# Patient Record
Sex: MALE | Race: WHITE | NOT HISPANIC OR LATINO | Employment: FULL TIME
[De-identification: names, ages, dates, MRNs, and addresses within clinical notes are randomized per-mention and may not be internally consistent; named-entity substitution may affect disease eponyms.]

---

## 2022-09-29 ENCOUNTER — TRANSCRIBE ORDERS (OUTPATIENT)
Dept: OTHER | Age: 27
End: 2022-09-29

## 2022-09-29 DIAGNOSIS — S39.012A STRAIN OF LUMBAR REGION, INITIAL ENCOUNTER: Primary | ICD-10-CM

## 2022-11-11 ENCOUNTER — THERAPY VISIT (OUTPATIENT)
Dept: PHYSICAL THERAPY | Facility: CLINIC | Age: 27
End: 2022-11-11
Attending: PHYSICIAN ASSISTANT
Payer: COMMERCIAL

## 2022-11-11 DIAGNOSIS — S39.012A STRAIN OF LUMBAR REGION, INITIAL ENCOUNTER: ICD-10-CM

## 2022-11-11 PROCEDURE — 97161 PT EVAL LOW COMPLEX 20 MIN: CPT | Mod: GP | Performed by: PHYSICAL THERAPIST

## 2022-11-11 PROCEDURE — 97110 THERAPEUTIC EXERCISES: CPT | Mod: GP | Performed by: PHYSICAL THERAPIST

## 2022-11-11 NOTE — LETTER
[facility]  [address]  [facility] [address]  [phone]    2022  Re: Bolivar Stinson   :   1995  MRN:  [id number]   REFERRING PHYSICIAN:   GILLES Swartz [facility]  Date of Initial Evaluation:  2022  Visits:  Rxs Used: 1  Reason for Referral:  Strain of lumbar region, initial encounter    EVALUATION SUMMARY    Physical Therapy Initial Evaluation  Subjective:  The history is provided by the patient. No  was used.   Patient Health History  Bolivar Stinson being seen for low back pain.   Problem occurred: lifting weights   Pain is reported as 4/10 on pain scale.  General health as reported by patient is good.  Pertinent medical history includes: migraines/headaches and numbness/tingling.   Medical allergies: none.   Other surgery history details: Microdiscectomy (2019).    Current medications:  Anti-depressants.    Current occupation is . Security..   Primary job tasks include:  Lifting/carrying, prolonged sitting, prolonged standing, pushing/pulling and repetitive tasks.         Therapist Generated HPI Evaluation  Problem details: Pt presents to physical therapy with L sided low back pain that began in 2017 when performing a deadlift. He had a microdiscectomy in 2019, followed by physical therapy which provided him with a mild improvement in symptoms. Pain has fluctuated throughout the years, but never fully resolved. Pt reports he has had occasionally pain down back into mid thigh. Bolivar needs to be able to deadlift 175# for his PT test for the ; he has not deadlifted in 5 years & has also not squatting    Type of problem:  Lumbar.  This is a recurrent condition. Condition occurred with:  Lifting.  Where condition occurred: in the community.  Patient reports pain:  Lower lumbar spine and mid lumbar spine, is  "intermittent.  Pain radiates to:  Gluteals left and thigh left. Pain timing: worse with activity.  Since onset symptoms are unchanged.  Associated symptoms:  Loss of motion/stiffness and loss of strength. Symptoms are exacerbated by lifting and other (running, planks)  and relieved by heat, NSAID's and rest (rolling out).  Previous treatment: physical therapy. Mild improvement following previous treatment.  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 Barriers include:  None as reported by patient.  Re: Bolivar Stinson. :   1995, page 2                 Lower extremity flexibility wnl: Pain in L side of back with L SLR.      Lumbar/SI Evaluation  ROM:    AROM Lumbar:   Flexion:            2\" below knee + pain L  Ext:                    50%   Side Bend:        Left:  1\" to knee + pain L    Right:  To knee   Rotation:           Left:  25%    Right:  25%  Side Glide:        Left:     Right:      Lumbar Myotomes:  normal  Neural Tension/Mobility:    Left side:SLR or SLR w/DF  negative.   Right side:   SLR w/DF or SLR  negative.   Spinal Segmental Conclusions: (+) Prone Instability Test L4  SI joint/Sacrum:    (-) Active SLR, (-) LINA B      General Evaluation:  Lower Extremity Flexibility:  Lower extremity flexibility wnl: Pain in L side of back with L SLR.  Hamstrings:  Decreased flexibility                                Assessment/Plan:    Patient is a 27 year old male with lumbar complaints.    Patient has the following significant findings with corresponding treatment plan.                Diagnosis 1:  L Low Back Pain    Pain -  manual therapy, self management, education and home program  Decreased ROM/flexibility - manual therapy, therapeutic exercise, therapeutic activity and home program  Decreased strength - therapeutic exercise, therapeutic activities and home program  Decreased function - thera activities, home program, " functional performance testing  Instability -  thera activity, thera exercise, neuromuscular re-education, home program    Therapy Evaluation Codes:   1) History comprised of:  Personal factors that impact the plan of care: Age, Overall behavior pattern, Past/current experiences, Profession and Time since onset of symptoms.    Comorbidity factors that impact the plan of care are: Depression and Numbness/tingling.     Medications impacting care: Anti-depressant.  2) Examination of Body Systems comprised of: Body structures and functions that impact the plan of care:  Lumbar spine.   Activity limitations that impact the plan of care are:Lifting and Working.  3) Clinical presentation characteristics are: Stable/Uncomplicated.  4) Decision-Making: Low complexity using standardized patient assessment instrument and/or measureable assessment of functional outcome.  Cumulative Therapy Evaluation is: Low complexity.  Previous and current functional limitations:  (See Goal Flow Sheet for this information)    Short term and Long term goals: (See Goal Flow Sheet for this information)   Communication ability:  Patient appears to be able to clearly communicate and understand verbal and written communication and follow directions correctly.  Treatment Explanation - The following has been discussed with the patient:   RX  Re: Bolivar Stinson. :   1995, page 3    ordered/plan of care. Anticipated outcomes.  Possible risks and side effects  This patient would benefit from PT intervention to resume normal activities.   Rehab potential is good.    Frequency:  1 X week, once daily  Duration:  for 6 weeks tapering to 2 X a week over 2 months  Discharge Plan:  Achieve all LTG.  Independent in home treatment program.  Return to previous functional level by discharge.  Reach maximal therapeutic benefit.      Christina Gastelum, SPT; Grant Harper PT, DPT    Attestation signed by Grant Harper PT at 2022  4:40  PM:  ----- Services Performed and Documented by a Medical Student in Presence of ATTENDING Physician-------      Thank you for your referral.    INQUIRIES  Therapist: Grant Harper, PT, DPT   [address] [address]  Phone: [phone]  Fax: [phone]

## 2022-11-11 NOTE — PROGRESS NOTES
Physical Therapy Initial Evaluation  Subjective:  The history is provided by the patient. No  was used.   Patient Health History  Bolivar Stinson being seen for low back pain.       Problem occurred: lifting weights   Pain is reported as 4/10 on pain scale.  General health as reported by patient is good.  Pertinent medical history includes: migraines/headaches and numbness/tingling.     Medical allergies: none.    Other surgery history details: Microdiscectomy (12/2019).    Current medications:  Anti-depressants.    Current occupation is . Security..   Primary job tasks include:  Lifting/carrying, prolonged sitting, prolonged standing, pushing/pulling and repetitive tasks.                  Therapist Generated HPI Evaluation  Problem details: Pt presents to physical therapy with L sided low back pain that began in 2017 when performing a deadlift. He had a microdiscectomy in 2019, followed by physical therapy which provided him with a mild improvement in symptoms. Pain has fluctuated throughout the years, but never fully resolved. Pt reports he has had occasionally pain down back into mid thigh. Bolivar needs to be able to deadlift 175# for his PT test for the ; he has not deadlifted in 5 years and has also not been squatting. .         Type of problem:  Lumbar.    This is a recurrent condition.  Condition occurred with:  Lifting.  Where condition occurred: in the community.  Patient reports pain:  Lower lumbar spine and mid lumbar spine.  and is intermittent.  Pain radiates to:  Gluteals left and thigh left. Pain timing: worse with activity.  Since onset symptoms are unchanged.  Associated symptoms:  Loss of motion/stiffness and loss of strength. Symptoms are exacerbated by lifting and other (running, planks)  and relieved by heat, NSAID's and rest (rolling out).    Previous treatment includes physical therapy. There was mild improvement following previous  "treatment.  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  Barriers include:  None as reported by patient.                        Objective:        Flexibility/Screens:           Lower extremity flexibility wnl: Pain in L side of back with L SLR.             Lumbar/SI Evaluation  ROM:    AROM Lumbar:   Flexion:            2\" below knee + pain L  Ext:                    50%   Side Bend:        Left:  1\" to knee + pain L    Right:  To knee   Rotation:           Left:  25%    Right:  25%  Side Glide:        Left:     Right:           Lumbar Myotomes:  normal                  Neural Tension/Mobility:      Left side:SLR or SLR w/DF  negative.     Right side:   SLR w/DF or SLR  negative.         Spinal Segmental Conclusions: (+) Prone Instability Test L4          SI joint/Sacrum:    (-) Active SLR, (-) LINA B                                                           General Evaluation:          Lower Extremity Flexibility:  Lower extremity flexibility wnl: Pain in L side of back with L SLR.              Hamstrings:  Decreased flexibility                                                                  ROS    Assessment/Plan:    Patient is a 27 year old male with lumbar complaints.    Patient has the following significant findings with corresponding treatment plan.                Diagnosis 1:  L Low Back Pain    Pain -  manual therapy, self management, education and home program  Decreased ROM/flexibility - manual therapy, therapeutic exercise, therapeutic activity and home program  Decreased strength - therapeutic exercise, therapeutic activities and home program  Decreased function - therapeutic activities, home program and functional performance testing  Instability -  therapeutic activity, therapeutic exercise, neuromuscular re-education, home program    Therapy Evaluation Codes:   1) History comprised of:   Personal factors that impact the plan of " care:      Age, Overall behavior pattern, Past/current experiences, Profession and Time since onset of symptoms.    Comorbidity factors that impact the plan of care are:      Depression and Numbness/tingling.     Medications impacting care: Anti-depressant.  2) Examination of Body Systems comprised of:   Body structures and functions that impact the plan of care:      Lumbar spine.   Activity limitations that impact the plan of care are:      Lifting and Working.  3) Clinical presentation characteristics are:   Stable/Uncomplicated.  4) Decision-Making    Low complexity using standardized patient assessment instrument and/or measureable assessment of functional outcome.  Cumulative Therapy Evaluation is: Low complexity.    Previous and current functional limitations:  (See Goal Flow Sheet for this information)    Short term and Long term goals: (See Goal Flow Sheet for this information)     Communication ability:  Patient appears to be able to clearly communicate and understand verbal and written communication and follow directions correctly.  Treatment Explanation - The following has been discussed with the patient:   RX ordered/plan of care  Anticipated outcomes  Possible risks and side effects  This patient would benefit from PT intervention to resume normal activities.   Rehab potential is good.    Frequency:  1 X week, once daily  Duration:  for 6 weeks tapering to 2 X a week over 2 months  Discharge Plan:  Achieve all LTG.  Independent in home treatment program.  Return to previous functional level by discharge.  Reach maximal therapeutic benefit.    Please refer to the daily flowsheet for treatment today, total treatment time and time spent performing 1:1 timed codes.     Christina Gastelum, SPT; NINOSKA GallowayT

## 2022-11-18 ENCOUNTER — THERAPY VISIT (OUTPATIENT)
Dept: PHYSICAL THERAPY | Facility: CLINIC | Age: 27
End: 2022-11-18
Payer: COMMERCIAL

## 2022-11-18 DIAGNOSIS — S39.012A STRAIN OF LUMBAR REGION, INITIAL ENCOUNTER: Primary | ICD-10-CM

## 2022-11-18 PROCEDURE — 97110 THERAPEUTIC EXERCISES: CPT | Mod: GP | Performed by: PHYSICAL THERAPY ASSISTANT

## 2023-01-10 PROBLEM — S39.012A STRAIN OF LUMBAR REGION, INITIAL ENCOUNTER: Status: RESOLVED | Noted: 2022-11-11 | Resolved: 2023-01-10

## 2023-01-10 NOTE — PROGRESS NOTES
Discharge Note    Progress reporting period is from initial evaluation date (please see noted date below) to Nov 18, 2022.  Linked Episodes   Type: Episode: Status: Noted: Resolved: Last update: Updated by:   PHYSICAL THERAPY L LBP - 11/11/2022 Active 11/11/2022 11/18/2022  2:11 PM Christina Gastelum      Comments:       Bolivar failed to follow up and current status is unknown.  Please see information below for last relevant information on current status.  Patient seen for 2 visits.    SUBJECTIVE  Subjective changes noted by patient:  Had soreness after first session but does feel flexibility is a bit better.  .  Current pain level is  (varies up to 5/10 depending on activity).     Previous pain level was  6/10.   Changes in function:  Yes (See Goal flowsheet attached for changes in current functional level)  Adverse reaction to treatment or activity: None    OBJECTIVE  Changes noted in objective findings: LROM has improved: flexion hand reach to mid lower leg, ext 75%. Crate lift floor to table =50#.     ASSESSMENT/PLAN  Diagnosis: L LBP   Updated problem list and treatment plan:   Pain - HEP  Decreased ROM/flexibility - HEP  Decreased function - HEP  STG/LTGs have been met or progress has been made towards goals:  Yes, please see goal flowsheet for most current information  Assessment of Progress: current status is unknown.    Last current status:     Self Management Plans:  HEP  I have re-evaluated this patient and find that the nature, scope, duration and intensity of the therapy is appropriate for the medical condition of the patient.  Bolivar continues to require the following intervention to meet STG and LTG's:  HEP.    Recommendations:  Discharge with current home program.  Patient to follow up with MD as needed.    Please refer to the daily flowsheet for treatment today, total treatment time and time spent performing 1:1 timed codes.    Grant Noel,PT, DPT, OCS

## 2023-02-12 ENCOUNTER — HEALTH MAINTENANCE LETTER (OUTPATIENT)
Age: 28
End: 2023-02-12

## 2024-03-10 ENCOUNTER — HEALTH MAINTENANCE LETTER (OUTPATIENT)
Age: 29
End: 2024-03-10

## 2025-03-16 ENCOUNTER — HEALTH MAINTENANCE LETTER (OUTPATIENT)
Age: 30
End: 2025-03-16